# Patient Record
Sex: FEMALE | Race: WHITE | NOT HISPANIC OR LATINO | URBAN - METROPOLITAN AREA
[De-identification: names, ages, dates, MRNs, and addresses within clinical notes are randomized per-mention and may not be internally consistent; named-entity substitution may affect disease eponyms.]

---

## 2019-01-28 ENCOUNTER — OFFICE (OUTPATIENT)
Dept: URBAN - METROPOLITAN AREA CLINIC 19 | Facility: CLINIC | Age: 34
End: 2019-01-28

## 2019-01-28 VITALS
HEART RATE: 85 BPM | WEIGHT: 291 LBS | SYSTOLIC BLOOD PRESSURE: 138 MMHG | HEIGHT: 68 IN | DIASTOLIC BLOOD PRESSURE: 89 MMHG

## 2019-01-28 DIAGNOSIS — D13.4 BENIGN NEOPLASM OF LIVER: ICD-10-CM

## 2019-01-28 DIAGNOSIS — K64.9 UNSPECIFIED HEMORRHOIDS: ICD-10-CM

## 2019-01-28 PROBLEM — K64.1 SECOND DEGREE HEMORRHOIDS: Status: ACTIVE | Noted: 2019-01-28

## 2019-01-28 LAB
COMP. METABOLIC PANEL (14): A/G RATIO: 1.3 (ref 1.2–2.2)
COMP. METABOLIC PANEL (14): ALBUMIN: 4.3 G/DL (ref 3.5–5.5)
COMP. METABOLIC PANEL (14): ALKALINE PHOSPHATASE: 71 IU/L (ref 39–117)
COMP. METABOLIC PANEL (14): ALT (SGPT): 56 IU/L — HIGH (ref 0–32)
COMP. METABOLIC PANEL (14): AST (SGOT): 42 IU/L — HIGH (ref 0–40)
COMP. METABOLIC PANEL (14): BILIRUBIN, TOTAL: 0.5 MG/DL (ref 0–1.2)
COMP. METABOLIC PANEL (14): BUN/CREATININE RATIO: 15 (ref 9–23)
COMP. METABOLIC PANEL (14): BUN: 13 MG/DL (ref 6–20)
COMP. METABOLIC PANEL (14): CALCIUM: 10 MG/DL (ref 8.7–10.2)
COMP. METABOLIC PANEL (14): CARBON DIOXIDE, TOTAL: 24 MMOL/L (ref 20–29)
COMP. METABOLIC PANEL (14): CHLORIDE: 95 MMOL/L — LOW (ref 96–106)
COMP. METABOLIC PANEL (14): CREATININE: 0.85 MG/DL (ref 0.57–1)
COMP. METABOLIC PANEL (14): EGFR IF AFRICN AM: 103 ML/MIN/1.73 (ref 59–?)
COMP. METABOLIC PANEL (14): EGFR IF NONAFRICN AM: 90 ML/MIN/1.73 (ref 59–?)
COMP. METABOLIC PANEL (14): GLOBULIN, TOTAL: 3.4 G/DL (ref 1.5–4.5)
COMP. METABOLIC PANEL (14): GLUCOSE: 129 MG/DL — HIGH (ref 65–99)
COMP. METABOLIC PANEL (14): POTASSIUM: 4.5 MMOL/L (ref 3.5–5.2)
COMP. METABOLIC PANEL (14): PROTEIN, TOTAL: 7.7 G/DL (ref 6–8.5)
COMP. METABOLIC PANEL (14): SODIUM: 138 MMOL/L (ref 134–144)

## 2019-01-28 PROCEDURE — 45330 DIAGNOSTIC SIGMOIDOSCOPY: CPT | Performed by: INTERNAL MEDICINE

## 2019-01-28 PROCEDURE — 99203 OFFICE O/P NEW LOW 30 MIN: CPT | Mod: 25 | Performed by: INTERNAL MEDICINE

## 2019-01-28 RX ORDER — HYDROCORTISONE ACETATE PRAMOXINE HCL 2.5; 1 G/100G; G/100G
CREAM TOPICAL
Qty: 30 | Refills: 3 | Status: ACTIVE
Start: 2019-01-28

## 2019-01-28 NOTE — SERVICEHPINOTES
Ms. Pacheco is a 33-year-old female with history of diabetes, hypertension, and sleep apnea that was previously seen in clinic by my partner, Dr. Washburn, in 2015 for large hepatic adenomas.  One of these was complicated by bleeding and she ultimately underwent surgery by [facility] where she had a resection.  She says she had 2 adenomas resected as well as a cholecystectomy for cholelithiasis.  After this she says she follows with the manually and has MRIs of her liver which have all been stable.  She presents today to discuss some ongoing rectal pain that she attributes to hemorrhoids.  She says she has a long history of external hemorrhoids.  They will intermittently and occasionally get irritated.  Last week they started to get irritated once again and then yesterday she developed nausea and vomiting along with diarrhea.  She says this was something that she ate as her  developed the same symptoms. this caused her hemorrhoids to become much worse.  She describes the pain as located in the anal area.  Is dull ache that is sometimes sharp.  It is exacerbated by movement such as walking or sitting.  She has been using preparation H multi-symptom relief which helps some but is not resolved the symptoms.  There are no other associated symptoms.  There are no other exacerbating or alleviating factors.  The pain does not radiate.  At her baseline she has 3-4 bowel movements per day.  She said the past she had 1 bowel movement per day but after starting metformin several years ago she would have more frequent bowel movements.  She describes her bowel movements soft and denies constipation or diarrhea.  She denies any blood in her stool.  Her weight is stable.

## 2019-01-28 NOTE — SERVICENOTES
We will obtain a CMP as well as recent surveillance imaging regarding her hepatic adenomas.  As for her hemorrhoids we will plan for topical therapy as well as Sitz baths.  We will plan for sigmoidoscopy to evaluate any internal aspect of the suspect there is some prolapse with this.  Ultimately, the patient might need surgical evaluation which we can refer if needed.